# Patient Record
Sex: FEMALE
[De-identification: names, ages, dates, MRNs, and addresses within clinical notes are randomized per-mention and may not be internally consistent; named-entity substitution may affect disease eponyms.]

---

## 2017-10-04 ENCOUNTER — APPOINTMENT (OUTPATIENT)
Dept: ANTEPARTUM | Facility: CLINIC | Age: 32
End: 2017-10-04
Payer: MEDICAID

## 2017-10-04 ENCOUNTER — ASOB RESULT (OUTPATIENT)
Age: 32
End: 2017-10-04

## 2017-10-04 PROBLEM — Z00.00 ENCOUNTER FOR PREVENTIVE HEALTH EXAMINATION: Status: ACTIVE | Noted: 2017-10-04

## 2017-10-04 PROCEDURE — 76816 OB US FOLLOW-UP PER FETUS: CPT

## 2017-10-04 PROCEDURE — 76801 OB US < 14 WKS SINGLE FETUS: CPT

## 2017-10-26 ENCOUNTER — ASOB RESULT (OUTPATIENT)
Age: 32
End: 2017-10-26

## 2017-10-26 ENCOUNTER — APPOINTMENT (OUTPATIENT)
Dept: ANTEPARTUM | Facility: CLINIC | Age: 32
End: 2017-10-26
Payer: MEDICAID

## 2017-10-26 PROCEDURE — 76817 TRANSVAGINAL US OBSTETRIC: CPT

## 2017-11-29 ENCOUNTER — ASOB RESULT (OUTPATIENT)
Age: 32
End: 2017-11-29

## 2017-11-29 ENCOUNTER — APPOINTMENT (OUTPATIENT)
Dept: ANTEPARTUM | Facility: CLINIC | Age: 32
End: 2017-11-29
Payer: MEDICAID

## 2017-11-29 PROCEDURE — 76811 OB US DETAILED SNGL FETUS: CPT

## 2017-11-29 PROCEDURE — 76817 TRANSVAGINAL US OBSTETRIC: CPT

## 2018-01-26 ENCOUNTER — APPOINTMENT (OUTPATIENT)
Dept: ANTEPARTUM | Facility: CLINIC | Age: 33
End: 2018-01-26
Payer: MEDICAID

## 2018-01-26 ENCOUNTER — ASOB RESULT (OUTPATIENT)
Age: 33
End: 2018-01-26

## 2018-01-26 PROCEDURE — 76819 FETAL BIOPHYS PROFIL W/O NST: CPT

## 2018-01-26 PROCEDURE — 76816 OB US FOLLOW-UP PER FETUS: CPT

## 2018-03-09 ENCOUNTER — APPOINTMENT (OUTPATIENT)
Dept: ANTEPARTUM | Facility: CLINIC | Age: 33
End: 2018-03-09
Payer: MEDICAID

## 2018-03-09 ENCOUNTER — ASOB RESULT (OUTPATIENT)
Age: 33
End: 2018-03-09

## 2018-03-09 PROCEDURE — 76816 OB US FOLLOW-UP PER FETUS: CPT

## 2018-03-09 PROCEDURE — 76819 FETAL BIOPHYS PROFIL W/O NST: CPT

## 2018-03-27 ENCOUNTER — TRANSCRIPTION ENCOUNTER (OUTPATIENT)
Age: 33
End: 2018-03-27

## 2018-03-28 ENCOUNTER — RESULT REVIEW (OUTPATIENT)
Age: 33
End: 2018-03-28

## 2018-03-29 ENCOUNTER — TRANSCRIPTION ENCOUNTER (OUTPATIENT)
Age: 33
End: 2018-03-29

## 2020-02-09 ENCOUNTER — EMERGENCY (EMERGENCY)
Facility: HOSPITAL | Age: 35
LOS: 1 days | Discharge: HOME | End: 2020-02-09
Attending: EMERGENCY MEDICINE
Payer: COMMERCIAL

## 2020-02-09 VITALS
RESPIRATION RATE: 18 BRPM | OXYGEN SATURATION: 97 % | HEART RATE: 73 BPM | TEMPERATURE: 98 F | HEIGHT: 66 IN | WEIGHT: 199.96 LBS | DIASTOLIC BLOOD PRESSURE: 73 MMHG | SYSTOLIC BLOOD PRESSURE: 115 MMHG

## 2020-02-09 VITALS
OXYGEN SATURATION: 100 % | DIASTOLIC BLOOD PRESSURE: 90 MMHG | TEMPERATURE: 98 F | SYSTOLIC BLOOD PRESSURE: 133 MMHG | RESPIRATION RATE: 18 BRPM | HEART RATE: 68 BPM

## 2020-02-09 LAB
ALBUMIN SERPL ELPH-MCNC: 4 G/DL — SIGNIFICANT CHANGE UP (ref 3.3–5.2)
ALP SERPL-CCNC: 90 U/L — SIGNIFICANT CHANGE UP (ref 40–120)
ALT FLD-CCNC: 23 U/L — SIGNIFICANT CHANGE UP
ANION GAP SERPL CALC-SCNC: 13 MMOL/L — SIGNIFICANT CHANGE UP (ref 5–17)
AST SERPL-CCNC: 22 U/L — SIGNIFICANT CHANGE UP
BILIRUB SERPL-MCNC: 0.7 MG/DL — SIGNIFICANT CHANGE UP (ref 0.4–2)
BUN SERPL-MCNC: 14 MG/DL — SIGNIFICANT CHANGE UP (ref 8–20)
CALCIUM SERPL-MCNC: 9.1 MG/DL — SIGNIFICANT CHANGE UP (ref 8.6–10.2)
CHLORIDE SERPL-SCNC: 101 MMOL/L — SIGNIFICANT CHANGE UP (ref 98–107)
CO2 SERPL-SCNC: 22 MMOL/L — SIGNIFICANT CHANGE UP (ref 22–29)
CREAT SERPL-MCNC: 0.47 MG/DL — LOW (ref 0.5–1.3)
GLUCOSE SERPL-MCNC: 83 MG/DL — SIGNIFICANT CHANGE UP (ref 70–99)
HCG SERPL-ACNC: <4 MIU/ML — SIGNIFICANT CHANGE UP
HCT VFR BLD CALC: 38.1 % — SIGNIFICANT CHANGE UP (ref 34.5–45)
HGB BLD-MCNC: 12.5 G/DL — SIGNIFICANT CHANGE UP (ref 11.5–15.5)
LIDOCAIN IGE QN: 15 U/L — LOW (ref 22–51)
MCHC RBC-ENTMCNC: 29.3 PG — SIGNIFICANT CHANGE UP (ref 27–34)
MCHC RBC-ENTMCNC: 32.8 GM/DL — SIGNIFICANT CHANGE UP (ref 32–36)
MCV RBC AUTO: 89.2 FL — SIGNIFICANT CHANGE UP (ref 80–100)
PLATELET # BLD AUTO: 368 K/UL — SIGNIFICANT CHANGE UP (ref 150–400)
POTASSIUM SERPL-MCNC: 4.2 MMOL/L — SIGNIFICANT CHANGE UP (ref 3.5–5.3)
POTASSIUM SERPL-SCNC: 4.2 MMOL/L — SIGNIFICANT CHANGE UP (ref 3.5–5.3)
PROT SERPL-MCNC: 7.2 G/DL — SIGNIFICANT CHANGE UP (ref 6.6–8.7)
RBC # BLD: 4.27 M/UL — SIGNIFICANT CHANGE UP (ref 3.8–5.2)
RBC # FLD: 12.4 % — SIGNIFICANT CHANGE UP (ref 10.3–14.5)
SODIUM SERPL-SCNC: 136 MMOL/L — SIGNIFICANT CHANGE UP (ref 135–145)
WBC # BLD: 7.4 K/UL — SIGNIFICANT CHANGE UP (ref 3.8–10.5)
WBC # FLD AUTO: 7.4 K/UL — SIGNIFICANT CHANGE UP (ref 3.8–10.5)

## 2020-02-09 PROCEDURE — T1013: CPT

## 2020-02-09 PROCEDURE — 96374 THER/PROPH/DIAG INJ IV PUSH: CPT | Mod: XU

## 2020-02-09 PROCEDURE — 99284 EMERGENCY DEPT VISIT MOD MDM: CPT | Mod: 25

## 2020-02-09 PROCEDURE — 99282 EMERGENCY DEPT VISIT SF MDM: CPT

## 2020-02-09 PROCEDURE — 85027 COMPLETE CBC AUTOMATED: CPT

## 2020-02-09 PROCEDURE — 99285 EMERGENCY DEPT VISIT HI MDM: CPT

## 2020-02-09 PROCEDURE — 74177 CT ABD & PELVIS W/CONTRAST: CPT

## 2020-02-09 PROCEDURE — 83690 ASSAY OF LIPASE: CPT

## 2020-02-09 PROCEDURE — 80053 COMPREHEN METABOLIC PANEL: CPT

## 2020-02-09 PROCEDURE — 84702 CHORIONIC GONADOTROPIN TEST: CPT

## 2020-02-09 PROCEDURE — 36415 COLL VENOUS BLD VENIPUNCTURE: CPT

## 2020-02-09 PROCEDURE — 74177 CT ABD & PELVIS W/CONTRAST: CPT | Mod: 26

## 2020-02-09 RX ORDER — SODIUM CHLORIDE 9 MG/ML
1000 INJECTION INTRAMUSCULAR; INTRAVENOUS; SUBCUTANEOUS ONCE
Refills: 0 | Status: COMPLETED | OUTPATIENT
Start: 2020-02-09 | End: 2020-02-09

## 2020-02-09 RX ORDER — OXYCODONE AND ACETAMINOPHEN 5; 325 MG/1; MG/1
1 TABLET ORAL ONCE
Refills: 0 | Status: DISCONTINUED | OUTPATIENT
Start: 2020-02-09 | End: 2020-02-09

## 2020-02-09 RX ORDER — MORPHINE SULFATE 50 MG/1
4 CAPSULE, EXTENDED RELEASE ORAL ONCE
Refills: 0 | Status: DISCONTINUED | OUTPATIENT
Start: 2020-02-09 | End: 2020-02-09

## 2020-02-09 RX ADMIN — OXYCODONE AND ACETAMINOPHEN 1 TABLET(S): 5; 325 TABLET ORAL at 20:20

## 2020-02-09 RX ADMIN — SODIUM CHLORIDE 1000 MILLILITER(S): 9 INJECTION INTRAMUSCULAR; INTRAVENOUS; SUBCUTANEOUS at 14:28

## 2020-02-09 RX ADMIN — MORPHINE SULFATE 4 MILLIGRAM(S): 50 CAPSULE, EXTENDED RELEASE ORAL at 14:28

## 2020-02-09 NOTE — CONSULT NOTE ADULT - ASSESSMENT
ASSESSMENT: Patient is a 34y old female with reducible ventral hernia w/o signs of obstruction. Patient states pain improved. It was explained the need for surgery but patient decided that since she is feeling better she will follow with ACS as an outpatient. Risk's of not having surgery were explained and it was informed to return to the ED if her symptoms recur.     PLAN:    - PO challenge  - pain control  - f/u this Tuesday on ACS clinic.   - Plan discussed with Attending, Dr. Peralta.

## 2020-02-09 NOTE — CONSULT NOTE ADULT - ATTENDING COMMENTS
I have seen and examined patient  abd pain after emesis with food poisoning, passing flatus  on exam no toxic, abd obese, there is a 6 by 8 cm mass in supraumbilical area tender to palpation but partially reducible, no skin changes.  normal white count, CT With omental fat on hernia concerning for omental fat necrosis no bowel.  no obstructive symptoms.  I offered patient urgent repair, she does not want an operation at this time, she understand her condition but has to arrange care for her children.  She will be seen I the office on 2/11 and book for repair.  if pain worsen or if she develops obstructive symptoms she is instructed to return to the ED

## 2020-02-09 NOTE — ED STATDOCS - CARE PROVIDER_API CALL
Robert Kidd)  Surgery; Surgical Critical Care  [address] [place] [id number]  Phone: [phone]  Fax: [phone]  Follow Up Time:

## 2020-02-09 NOTE — ED STATDOCS - NSFOLLOWUPCLINICS_GEN_ALL_ED_FT
[facility] General Surgery  Surgery  [address] [place] [address]  Phone: [phone]  Fax:   Follow Up Time:

## 2020-02-09 NOTE — ED STATDOCS - ATTENDING CONTRIBUTION TO CARE
Valarie: I performed a face to face bedside interview with patient regarding history of present illness, review of symptoms and past medical history. I completed an independent physical exam and ordered tests/medications as needed.  I have discussed patient's plan of care with advanced care provider. The advanced care provider assisted in  executing the discussed plan. I was available for any questions or issues that may have arose during the execution of the plan of care.

## 2020-02-09 NOTE — ED STATDOCS - NSFOLLOWUPINSTRUCTIONS_ED_ALL_ED_FT
1. TAKE ALL MEDICATIONS AS DIRECTED.  FOR PAIN YOU CAN TAKE IBUPROFEN (MOTRIN, ADVIL) OR ACETAMINOPHEN (TYLENOL) AS NEEDED, AS DIRECTED ON PACKAGING.  2. FOLLOW UP WITH __Surgery________ AS DIRECTED.  YOU WERE GIVEN COPIES OF ALL LABS AND IMAGING RESULTS FROM YOUR ER VISIT--PLEASE TAKE THEM WITH YOU TO YOUR APPOINTMENT.  3. IF NEEDED, CALL [phone] TO FIND A PRIMARY CARE PHYSICIAN.  OR CALL [phone] TO MAKE AN APPOINTMENT WITH THE MEDICAL CLINIC.  4. RETURN TO THE ER FOR ANY WORSENING SYMPTOMS.    Hernia    A hernia is when fat or the intestines push through a weak area in the abdominal wall. This can occur around the belly button (umbilical hernia) or where the leg meets the lower abdomen (inguinal hernia). This creates a rounded lump (bulge). Hernias that can be pushed back into the belly are called reducible and those that cannot are called incarcerated. Hernias that are not reducible and lose their blood supply are called strangulated and require emergency surgery. Hernias can be caused or worsened when straining during bowel movements or lifting heavy objects as well as coughing or sneezing. Surgery may be helpful in treating this condition so follow up with a surgeon.    SEEK IMMEDIATE MEDICAL CARE IF YOU HAVE ANY OF THE FOLLOWING SYMPTOMS: worsening abdominal pain, change in color over the hernia, nausea/vomiting, or inability to have a bowel movement or pass gas.

## 2020-02-09 NOTE — ED STATDOCS - PHYSICAL EXAMINATION
Gen: Well appearing in NAD  Head: NC/AT  Neck: trachea midline  Resp:  No distress  Abd: Tenderness over ventral hernia. No overlying erythema. Positive Guarding, no rebound.   Ext: no deformities  Neuro:  A&O appears non focal  Skin:  Warm and dry as visualized  Psych:  Normal affect and mood Gen: Well appearing in NAD  Head: NC/AT  Neck: trachea midline  Resp:  No distress  Abd: Tenderness over firm ventral hernia. No overlying erythema. Positive Guarding, no rebound.   Ext: no deformities  Neuro:  A&O appears non focal  Skin:  Warm and dry as visualized  Psych:  Normal affect and mood

## 2020-02-09 NOTE — ED STATDOCS - PATIENT PORTAL LINK FT
You can access the FollowMyHealth Patient Portal offered by [facility] by registering at the following website: [email or web address]. By joining [employer]’s FollowMyHealth portal, you will also be able to view your health information using other applications (apps) compatible with our system.

## 2020-02-09 NOTE — ED STATDOCS - PROGRESS NOTE DETAILS
Results noted and findings d/w pt. Pt reexamined - abdomen obese with + large ventral hernia - tender to palp and unable to reduce. ACS called for consult Pt seen and cleared by surgery, will follow pt in office

## 2020-02-09 NOTE — ED ADULT TRIAGE NOTE - CHIEF COMPLAINT QUOTE
Patient arrived to ED today with c/o constipation, diarrhea, cough, abdominal pains.  Patient with abdominal hernia.  Patient states symptoms for the past week.

## 2020-02-09 NOTE — ED STATDOCS - OBJECTIVE STATEMENT
35 y/o F pt with no significant PMHx presents to the ED c/o abdominal pain that started 6 days ago, as well as, N/V/D. Yesterday she had a small bowel movement and she also began to notice that her abdomen appeared "swollen." Over the past week she has had 3 episodes of emesis. Pt states that at home her two children have also been experiencing similar symptoms. Denies fever. SHx:  x2. No further complaints at this time.  : Doris 35 y/o F pt with no significant PMHx presents to the ED c/o abdominal pain that started 6 days ago, as well as, N/V/D. Yesterday she had a small bowel movement and she also began to notice that her abdomen appeared "swollen." Over the past week she has had 3 episodes of emesis. Has a known hernia, feels like it is blocked. Pt states that at home her two children have also been experiencing similar symptoms. Denies fever. SHx:  x2. No further complaints at this time.  : Doris

## 2020-02-09 NOTE — CONSULT NOTE ADULT - SUBJECTIVE AND OBJECTIVE BOX
ACUTE CARE SURGERY CONSULT      HPI: 34y Female with no PMH who presented to the ED with severe, increasing abdominal pain localized in her ventral hernia. Patient states that last week all her family had food poisoning and she started vomiting, the next day she stopped vomiting but developed mild abdominal pain that kept getting worse until she presented today. Nausea and vomiting resolved. patient states that she's always had an hernia but now she cannot reduce it. Passing flatus, last time was today. Last BM was yesterday. Tolerating diet. Denies fever, chills, chest pain, and sob.     ROS: 10-system review is otherwise negative except HPI above.      PAST MEDICAL & SURGICAL HISTORY:  No pertinent past medical history  No significant past surgical history    FAMILY HISTORY:  No relevant family history     SOCIAL HISTORY:  Denies any toxic habits    ALLERGIES: NKA Zyrtec (Unknown)      HOME MEDICATIONS: Denies       --------------------------------------------------------------------------------------------    PHYSICAL EXAM:   General: NAD, Lying in bed comfortably  Neuro: A+Ox3  HEENT: EOMI, PERRLA, MMM  Cardio: RRR   Resp: Non labored breathing   GI/Abd: Soft, ND. Tender to palpation in ventral hernia which is reducible. No skin changes.   Vascular: All 4 extremities warm.  Musculoskeletal: All 4 extremities moving spontaneously, no limitations, no spinal tenderness or stepoffs  --------------------------------------------------------------------------------------------    LABS                 12.5   7.40   )----------(  368       ( 09 Feb 2020 14:36 )               38.1      136    |  101    |  14.0   ----------------------------<  83         ( 09 Feb 2020 14:36 )  4.2     |  22.0   |  0.47     Ca    9.1        ( 09 Feb 2020 14:36 )    TPro  7.2    /  Alb  4.0    /  TBili  0.7    /  DBili  x      /  AST  22     /  ALT  23     /  AlkPhos  90     ( 09 Feb 2020 14:36 )    LIVER FUNCTIONS - ( 09 Feb 2020 14:36 )  Alb: 4.0 g/dL / Pro: 7.2 g/dL / ALK PHOS: 90 U/L / ALT: 23 U/L / AST: 22 U/L / GGT: x               CAPILLARY BLOOD GLUCOSE    --------------------------------------------------------------------------------------------  IMAGING  EXAM: CT ABDOMEN AND PELVIS OC      PROCEDURE DATE: 02/09/2020         INTERPRETATION: CLINICAL INFORMATION: Tender ventral hernia.     COMPARISON: None.     PROCEDURE:   CT of the Abdomen and Pelvis was performed with intravenous contrast.   Intravenous contrast: 90 ml Omnipaque 350. 10 ml discarded.   Oral contrast: positive contrast was administered.   Sagittal and coronal reformats were performed.     FINDINGS:     LOWER CHEST: Within normal limits.     LIVER: Diffuse hepatic steatosis.   BILE DUCTS: Normal caliber.   GALLBLADDER: Within normal limits.   SPLEEN: Small splenule lateral to the spleen adjacent to the diaphragm.   PANCREAS: Within normal limits.   ADRENALS: Within normal limits.   KIDNEYS/URETERS: Within normal limits.     BLADDER: Within normal limits.   REPRODUCTIVE ORGANS: Calcified anterior fibroid impressing on the dome of   the bladder.     BOWEL: No bowel obstruction. Appendix is normal.   PERITONEUM: No ascites.   VESSELS: Within normal limits.   RETROPERITONEUM/LYMPH NODES: No lymphadenopathy.   ABDOMINAL WALL: There is a large ventral supraumbilical fat-containing   abdominal wall hernia with fascial defect measuring 3.3 x 2.9 cm containing   omental fat. There is significant fat stranding and fluid within the hernia   sac consistent with fat necrosis. The fat stranding extends back into the   peritoneal cavity along the omentum. There is a second ventral periumbilical   fat-containing hernia which also demonstrates omental fat stranding.   BONES: Within normal limits.     IMPRESSION:   1. Large ventral supraumbilical fat-containing hernia containing both   abdominal and omental fat. There is significant fat stranding of the omental   fat with fluid consistent with fat necrosis.   2. There is a second ventral abdominal wall periumbilical hernia also   containing omental fat.   3. Diffuse hepatic steatosis.

## 2020-02-09 NOTE — ED STATDOCS - CLINICAL SUMMARY MEDICAL DECISION MAKING FREE TEXT BOX
Pt with known hernia presents with abdominal pain and decreased bowel movement. Tenderness to the ventral hernia on exam. Plan for labs, CT, pain control and re-asses.

## 2020-02-11 ENCOUNTER — APPOINTMENT (OUTPATIENT)
Dept: TRAUMA SURGERY | Facility: CLINIC | Age: 35
End: 2020-02-11
Payer: COMMERCIAL

## 2020-02-11 VITALS
BODY MASS INDEX: 39.47 KG/M2 | SYSTOLIC BLOOD PRESSURE: 119 MMHG | DIASTOLIC BLOOD PRESSURE: 79 MMHG | WEIGHT: 220 LBS | HEART RATE: 74 BPM | OXYGEN SATURATION: 97 % | TEMPERATURE: 97.7 F | HEIGHT: 62.5 IN

## 2020-02-11 PROCEDURE — 99211 OFF/OP EST MAY X REQ PHY/QHP: CPT

## 2020-02-16 ENCOUNTER — TRANSCRIPTION ENCOUNTER (OUTPATIENT)
Age: 35
End: 2020-02-16

## 2020-02-17 ENCOUNTER — RESULT REVIEW (OUTPATIENT)
Age: 35
End: 2020-02-17

## 2020-02-17 ENCOUNTER — INPATIENT (INPATIENT)
Facility: HOSPITAL | Age: 35
LOS: 0 days | Discharge: HOME | DRG: 355 | End: 2020-02-18
Attending: STUDENT IN AN ORGANIZED HEALTH CARE EDUCATION/TRAINING PROGRAM | Admitting: STUDENT IN AN ORGANIZED HEALTH CARE EDUCATION/TRAINING PROGRAM
Payer: COMMERCIAL

## 2020-02-17 VITALS
TEMPERATURE: 98 F | SYSTOLIC BLOOD PRESSURE: 117 MMHG | DIASTOLIC BLOOD PRESSURE: 69 MMHG | RESPIRATION RATE: 18 BRPM | WEIGHT: 210.1 LBS | HEART RATE: 73 BPM | OXYGEN SATURATION: 100 % | HEIGHT: 66 IN

## 2020-02-17 DIAGNOSIS — K43.9 VENTRAL HERNIA WITHOUT OBSTRUCTION OR GANGRENE: ICD-10-CM

## 2020-02-17 PROBLEM — Z78.9 OTHER SPECIFIED HEALTH STATUS: Chronic | Status: ACTIVE | Noted: 2020-02-09

## 2020-02-17 LAB
ALBUMIN SERPL ELPH-MCNC: 4.2 G/DL — SIGNIFICANT CHANGE UP (ref 3.3–5.2)
ALP SERPL-CCNC: 88 U/L — SIGNIFICANT CHANGE UP (ref 40–120)
ALT FLD-CCNC: 22 U/L — SIGNIFICANT CHANGE UP
ANION GAP SERPL CALC-SCNC: 8 MMOL/L — SIGNIFICANT CHANGE UP (ref 5–17)
AST SERPL-CCNC: 21 U/L — SIGNIFICANT CHANGE UP
BASOPHILS # BLD AUTO: 0.04 K/UL — SIGNIFICANT CHANGE UP (ref 0–0.2)
BASOPHILS NFR BLD AUTO: 0.6 % — SIGNIFICANT CHANGE UP (ref 0–2)
BILIRUB SERPL-MCNC: 0.3 MG/DL — LOW (ref 0.4–2)
BLD GP AB SCN SERPL QL: SIGNIFICANT CHANGE UP
BUN SERPL-MCNC: 16 MG/DL — SIGNIFICANT CHANGE UP (ref 8–20)
CALCIUM SERPL-MCNC: 8.8 MG/DL — SIGNIFICANT CHANGE UP (ref 8.6–10.2)
CHLORIDE SERPL-SCNC: 104 MMOL/L — SIGNIFICANT CHANGE UP (ref 98–107)
CO2 SERPL-SCNC: 27 MMOL/L — SIGNIFICANT CHANGE UP (ref 22–29)
CREAT SERPL-MCNC: 0.56 MG/DL — SIGNIFICANT CHANGE UP (ref 0.5–1.3)
EOSINOPHIL # BLD AUTO: 0.38 K/UL — SIGNIFICANT CHANGE UP (ref 0–0.5)
EOSINOPHIL NFR BLD AUTO: 5.5 % — SIGNIFICANT CHANGE UP (ref 0–6)
GLUCOSE SERPL-MCNC: 87 MG/DL — SIGNIFICANT CHANGE UP (ref 70–99)
HCT VFR BLD CALC: 35.1 % — SIGNIFICANT CHANGE UP (ref 34.5–45)
HGB BLD-MCNC: 11.5 G/DL — SIGNIFICANT CHANGE UP (ref 11.5–15.5)
IMM GRANULOCYTES NFR BLD AUTO: 0.1 % — SIGNIFICANT CHANGE UP (ref 0–1.5)
LACTATE BLDV-MCNC: 0.4 MMOL/L — LOW (ref 0.5–2)
LYMPHOCYTES # BLD AUTO: 3.41 K/UL — HIGH (ref 1–3.3)
LYMPHOCYTES # BLD AUTO: 49.5 % — HIGH (ref 13–44)
MCHC RBC-ENTMCNC: 29.6 PG — SIGNIFICANT CHANGE UP (ref 27–34)
MCHC RBC-ENTMCNC: 32.8 GM/DL — SIGNIFICANT CHANGE UP (ref 32–36)
MCV RBC AUTO: 90.5 FL — SIGNIFICANT CHANGE UP (ref 80–100)
MONOCYTES # BLD AUTO: 0.53 K/UL — SIGNIFICANT CHANGE UP (ref 0–0.9)
MONOCYTES NFR BLD AUTO: 7.7 % — SIGNIFICANT CHANGE UP (ref 2–14)
NEUTROPHILS # BLD AUTO: 2.52 K/UL — SIGNIFICANT CHANGE UP (ref 1.8–7.4)
NEUTROPHILS NFR BLD AUTO: 36.6 % — LOW (ref 43–77)
PLATELET # BLD AUTO: 387 K/UL — SIGNIFICANT CHANGE UP (ref 150–400)
POTASSIUM SERPL-MCNC: 3.8 MMOL/L — SIGNIFICANT CHANGE UP (ref 3.5–5.3)
POTASSIUM SERPL-SCNC: 3.8 MMOL/L — SIGNIFICANT CHANGE UP (ref 3.5–5.3)
PROT SERPL-MCNC: 6.8 G/DL — SIGNIFICANT CHANGE UP (ref 6.6–8.7)
RBC # BLD: 3.88 M/UL — SIGNIFICANT CHANGE UP (ref 3.8–5.2)
RBC # FLD: 12.3 % — SIGNIFICANT CHANGE UP (ref 10.3–14.5)
SODIUM SERPL-SCNC: 139 MMOL/L — SIGNIFICANT CHANGE UP (ref 135–145)
WBC # BLD: 6.89 K/UL — SIGNIFICANT CHANGE UP (ref 3.8–10.5)
WBC # FLD AUTO: 6.89 K/UL — SIGNIFICANT CHANGE UP (ref 3.8–10.5)

## 2020-02-17 PROCEDURE — 74177 CT ABD & PELVIS W/CONTRAST: CPT | Mod: 26

## 2020-02-17 PROCEDURE — 49568: CPT

## 2020-02-17 PROCEDURE — 49561: CPT

## 2020-02-17 PROCEDURE — 99222 1ST HOSP IP/OBS MODERATE 55: CPT

## 2020-02-17 PROCEDURE — 99285 EMERGENCY DEPT VISIT HI MDM: CPT

## 2020-02-17 PROCEDURE — 49568: CPT | Mod: 80

## 2020-02-17 PROCEDURE — 49561: CPT | Mod: 80

## 2020-02-17 PROCEDURE — 88302 TISSUE EXAM BY PATHOLOGIST: CPT | Mod: 26

## 2020-02-17 RX ORDER — ACETAMINOPHEN 500 MG
650 TABLET ORAL EVERY 6 HOURS
Refills: 0 | Status: DISCONTINUED | OUTPATIENT
Start: 2020-02-17 | End: 2020-02-18

## 2020-02-17 RX ORDER — ONDANSETRON 8 MG/1
4 TABLET, FILM COATED ORAL ONCE
Refills: 0 | Status: DISCONTINUED | OUTPATIENT
Start: 2020-02-17 | End: 2020-02-17

## 2020-02-17 RX ORDER — SENNA PLUS 8.6 MG/1
2 TABLET ORAL AT BEDTIME
Refills: 0 | Status: DISCONTINUED | OUTPATIENT
Start: 2020-02-17 | End: 2020-02-18

## 2020-02-17 RX ORDER — HYDROMORPHONE HYDROCHLORIDE 2 MG/ML
0.5 INJECTION INTRAMUSCULAR; INTRAVENOUS; SUBCUTANEOUS
Refills: 0 | Status: DISCONTINUED | OUTPATIENT
Start: 2020-02-17 | End: 2020-02-17

## 2020-02-17 RX ORDER — SODIUM CHLORIDE 9 MG/ML
1000 INJECTION INTRAMUSCULAR; INTRAVENOUS; SUBCUTANEOUS
Refills: 0 | Status: DISCONTINUED | OUTPATIENT
Start: 2020-02-17 | End: 2020-02-18

## 2020-02-17 RX ORDER — OXYCODONE HYDROCHLORIDE 5 MG/1
5 TABLET ORAL EVERY 4 HOURS
Refills: 0 | Status: DISCONTINUED | OUTPATIENT
Start: 2020-02-17 | End: 2020-02-18

## 2020-02-17 RX ORDER — SODIUM CHLORIDE 9 MG/ML
1000 INJECTION, SOLUTION INTRAVENOUS
Refills: 0 | Status: DISCONTINUED | OUTPATIENT
Start: 2020-02-17 | End: 2020-02-17

## 2020-02-17 RX ORDER — ONDANSETRON 8 MG/1
4 TABLET, FILM COATED ORAL EVERY 6 HOURS
Refills: 0 | Status: DISCONTINUED | OUTPATIENT
Start: 2020-02-17 | End: 2020-02-18

## 2020-02-17 RX ORDER — PANTOPRAZOLE SODIUM 20 MG/1
40 TABLET, DELAYED RELEASE ORAL
Refills: 0 | Status: DISCONTINUED | OUTPATIENT
Start: 2020-02-17 | End: 2020-02-18

## 2020-02-17 RX ORDER — MORPHINE SULFATE 50 MG/1
2 CAPSULE, EXTENDED RELEASE ORAL ONCE
Refills: 0 | Status: DISCONTINUED | OUTPATIENT
Start: 2020-02-17 | End: 2020-02-17

## 2020-02-17 RX ORDER — ENOXAPARIN SODIUM 100 MG/ML
40 INJECTION SUBCUTANEOUS EVERY 24 HOURS
Refills: 0 | Status: DISCONTINUED | OUTPATIENT
Start: 2020-02-17 | End: 2020-02-18

## 2020-02-17 RX ORDER — HYDROMORPHONE HYDROCHLORIDE 2 MG/ML
0.5 INJECTION INTRAMUSCULAR; INTRAVENOUS; SUBCUTANEOUS EVERY 4 HOURS
Refills: 0 | Status: DISCONTINUED | OUTPATIENT
Start: 2020-02-17 | End: 2020-02-18

## 2020-02-17 RX ORDER — FENTANYL CITRATE 50 UG/ML
25 INJECTION INTRAVENOUS
Refills: 0 | Status: DISCONTINUED | OUTPATIENT
Start: 2020-02-17 | End: 2020-02-17

## 2020-02-17 RX ADMIN — HYDROMORPHONE HYDROCHLORIDE 0.5 MILLIGRAM(S): 2 INJECTION INTRAMUSCULAR; INTRAVENOUS; SUBCUTANEOUS at 17:27

## 2020-02-17 RX ADMIN — HYDROMORPHONE HYDROCHLORIDE 0.5 MILLIGRAM(S): 2 INJECTION INTRAMUSCULAR; INTRAVENOUS; SUBCUTANEOUS at 15:03

## 2020-02-17 RX ADMIN — SENNA PLUS 2 TABLET(S): 8.6 TABLET ORAL at 22:44

## 2020-02-17 RX ADMIN — HYDROMORPHONE HYDROCHLORIDE 0.5 MILLIGRAM(S): 2 INJECTION INTRAMUSCULAR; INTRAVENOUS; SUBCUTANEOUS at 17:42

## 2020-02-17 RX ADMIN — HYDROMORPHONE HYDROCHLORIDE 0.5 MILLIGRAM(S): 2 INJECTION INTRAMUSCULAR; INTRAVENOUS; SUBCUTANEOUS at 15:28

## 2020-02-17 RX ADMIN — OXYCODONE HYDROCHLORIDE 5 MILLIGRAM(S): 5 TABLET ORAL at 21:25

## 2020-02-17 RX ADMIN — Medication 650 MILLIGRAM(S): at 19:52

## 2020-02-17 RX ADMIN — SODIUM CHLORIDE 120 MILLILITER(S): 9 INJECTION INTRAMUSCULAR; INTRAVENOUS; SUBCUTANEOUS at 20:27

## 2020-02-17 RX ADMIN — OXYCODONE HYDROCHLORIDE 5 MILLIGRAM(S): 5 TABLET ORAL at 20:25

## 2020-02-17 RX ADMIN — Medication 650 MILLIGRAM(S): at 20:52

## 2020-02-17 RX ADMIN — HYDROMORPHONE HYDROCHLORIDE 0.5 MILLIGRAM(S): 2 INJECTION INTRAMUSCULAR; INTRAVENOUS; SUBCUTANEOUS at 22:58

## 2020-02-17 RX ADMIN — HYDROMORPHONE HYDROCHLORIDE 0.5 MILLIGRAM(S): 2 INJECTION INTRAMUSCULAR; INTRAVENOUS; SUBCUTANEOUS at 22:41

## 2020-02-17 RX ADMIN — ENOXAPARIN SODIUM 40 MILLIGRAM(S): 100 INJECTION SUBCUTANEOUS at 17:27

## 2020-02-17 RX ADMIN — MORPHINE SULFATE 2 MILLIGRAM(S): 50 CAPSULE, EXTENDED RELEASE ORAL at 03:59

## 2020-02-17 NOTE — CONSULT NOTE ADULT - ASSESSMENT
34 year old female with known ventral hernias currently with no evidence of strangulation, however, pending CT A/P  to assess for any interval changes since last CT scan given worsening abdominal pain. ACS/Trauma team following and attending to see patient in the AM.

## 2020-02-17 NOTE — H&P ADULT - HISTORY OF PRESENT ILLNESS
34 year old female presenting with chief complaint of abdominal pain x1 day. Patient known to ACS/Trauma team as she had presented with abdominal pain 2/9/20 and found to have a large ventral supraumbilical and periumbilical hernia requiring operative repair. Patient followed up with ACS/Trauma office and was scheduled for surgery 2/19/20 and told to return to ED should abdominal pain worsen prior to surgery. Describes pain as sharp, constant, non radiating pain located in supraumbilical region. Reports that she is tolerating diet, having flatus and had a BM day prior to presentation. ROS negative for fever, chills, nausea, vomiting, chest pain, SOB, dyspnea, dysuria or changes in bowel habits.

## 2020-02-17 NOTE — H&P ADULT - NSHPLABSRESULTS_GEN_ALL_CORE
< from: CT Abdomen and Pelvis w/ IV Cont (02.17.20 @ 06:59) >    IMPRESSION:   Stable exam. Supraumbilical ventral abdominal wall fat-containing hernia without evidence of fat necrosis. Small fat-containing umbilical hernia and inflammatory changes in the adjacent ventral abdominal wall fat.    No bowel obstruction or wall thickening.    < end of copied text >

## 2020-02-17 NOTE — ED ADULT NURSE NOTE - CHPI ED NUR SYMPTOMS NEG
no tingling/no weakness/no fever/no nausea/no chills/no dizziness/no pain/no decreased eating/drinking/no vomiting

## 2020-02-17 NOTE — CONSULT NOTE ADULT - ATTENDING COMMENTS
Patient seen and examined  epigastric symptomatic ventral hernia with incarcerated omentum.  wa scheduled for latter this week but pain worsen.  prepare for OR

## 2020-02-17 NOTE — CONSULT NOTE ADULT - SUBJECTIVE AND OBJECTIVE BOX
34 year old female presenting with chief complaint of abdominal pain x1 day. Patient known to ACS/Trauma team as she had presented with abdominal pain 2/9/20 and found to have a large ventral supraumbilical and periumbilical hernia requiring operative repair. Patient followed up with ACS/Trauma office and was scheduled for surgery 2/19/20 and told to return to ED should abdominal pain worsen prior to surgery. Describes pain as sharp, constant, non radiating pain located in supraumbilical region. Reports that she is tolerating diet, having flatus and had a BM day prior to presentation. ROS negative for fever, chills, nausea, vomiting, chest pain, SOB, dyspnea, dysuria or changes in bowel habits.   PMHx: Denies   PSHx: C section x 2   Allergies: Zyrtec (rash)   Home Meds: Denies   FamHx: Non contributory   SocHx: Denies toxic habits     VITALS & I/Os:  Vital Signs Last 24 Hrs  T(C): 36.7 (17 Feb 2020 01:46), Max: 36.7 (17 Feb 2020 01:46)  T(F): 98.1 (17 Feb 2020 01:46), Max: 98.1 (17 Feb 2020 01:46)  HR: 73 (17 Feb 2020 01:46) (73 - 73)  BP: 117/69 (17 Feb 2020 01:46) (117/69 - 117/69)  BP(mean): --  RR: 18 (17 Feb 2020 01:46) (18 - 18)  SpO2: 100% (17 Feb 2020 01:46) (100% - 100%)  CAPILLARY BLOOD GLUCOSE    I&O's Summary    Constitutional: Obese Kazakh speaking only patient resting comfortably in bed in no acute distress   HEENT: EOMI/PERRL b/l  Neck: No JVD, full ROM w/o pain  Respiratory: CTAB with unlabored respirations, no accessory muscle use or conversational dyspnea   Cardiovascular: Regular rate and rhythm with no arrythmias or murmurs  Gastrointestinal: Abdomen soft, periumbilical hernia moderately tender to palpation with no overlying skin changes, non-distended with no rebound tenderness or guarding   Rectal: Not indicated   Neurological: GCS 15 (E4V5M6) with no gross sensory, motor or coordination deficits   Psychiatric: Normal mood and affect   Musculoskeletal: No joint pain, swelling or deformity with no limitation of movement     LABS:                        11.5   6.89  )-----------( 387      ( 17 Feb 2020 04:16 )             35.1     02-17    139  |  104  |  16.0  ----------------------------<  87  3.8   |  27.0  |  0.56    Ca    8.8      17 Feb 2020 04:16    TPro  6.8  /  Alb  4.2  /  TBili  0.3<L>  /  DBili  x   /  AST  21  /  ALT  22  /  AlkPhos  88  02-17    Lactate:      02-17 @ 04:15  0.4

## 2020-02-17 NOTE — ED ADULT NURSE NOTE - OBJECTIVE STATEMENT
pt is a 33 y/o female presenting ot the ed with epigastric abdominal pain. pt recently seen at [facility] on feb 8th for abdominal pain - patient with hx of ventral hernia was seen by surgery offered surgery and declined, told to follow up outpatiently and never did, returning for continued pain. pt with bowel movements. pt denies cp, SOB, fevers, nausea, vomiting

## 2020-02-17 NOTE — H&P ADULT - ASSESSMENT
34 year old female with known ventral hernias currently with no evidence of strangulation.    -Admit to Surgery  -OR today for hernia repair   -NPO/IVFs  -Pain management   Encourage I/S  -DVT prophylaxis

## 2020-02-17 NOTE — CHART NOTE - NSCHARTNOTEFT_GEN_A_CORE
POST-OPERATIVE NOTE    Subjective:  Patient is s/p open ventral hernia repair with retrorectus placement of prolene mesh. Patient doing well post operatively. tolerating diet. pain moderately controlled. Patient pulling 1000 on IS. patient voiding spontaneously. Patient has not ambulated 2/2 pain. denies f/c/sob/n/v. AVSS hemodynamically stable.      Vital Signs Last 24 Hrs  T(C): 37.1 (17 Feb 2020 20:15), Max: 37.2 (17 Feb 2020 14:33)  T(F): 98.7 (17 Feb 2020 20:15), Max: 99 (17 Feb 2020 14:33)  HR: 97 (17 Feb 2020 20:15) (67 - 97)  BP: 116/67 (17 Feb 2020 20:15) (95/54 - 117/69)  BP(mean): 62 (17 Feb 2020 16:30) (62 - 74)  RR: 18 (17 Feb 2020 20:15) (16 - 22)  SpO2: 95% (17 Feb 2020 20:15) (93% - 100%)  I&O's Detail    17 Feb 2020 07:01  -  17 Feb 2020 23:04  --------------------------------------------------------  IN:  Total IN: 0 mL    OUT:    Voided: 1450 mL  Total OUT: 1450 mL    Total NET: -1450 mL        enoxaparin Injectable 40    PAST MEDICAL & SURGICAL HISTORY:  No pertinent past medical history  No significant past surgical history        Physical Exam:  General: NAD, resting comfortably in bed  Pulmonary: Nonlabored breathing, no respiratory distress  Cardiovascular: NSR  Abdominal: soft, appropriately tender. abdominal binder in place. dressings with mild serosanguinous saturation without active bleeding  Extremities: [facility]      LABS:                        11.5   6.89  )-----------( 387      ( 17 Feb 2020 04:16 )             35.1     02-17    139  |  104  |  16.0  ----------------------------<  87  3.8   |  27.0  |  0.56    Ca    8.8      17 Feb 2020 04:16    TPro  6.8  /  Alb  4.2  /  TBili  0.3<L>  /  DBili  x   /  AST  21  /  ALT  22  /  AlkPhos  88  02-17      CAPILLARY BLOOD GLUCOSE          Radiology and Additional Studies:    Assessment:  The patient is a 34y Female who is now several hours post-op from a VHR with retrorectus mesh    Plan:  - Pain control as needed  - DVT ppx  - OOB and ambulating as tolerated  - regular diet  - likely D/c 2/18 am

## 2020-02-17 NOTE — BRIEF OPERATIVE NOTE - NSICDXBRIEFPROCEDURE_GEN_ALL_CORE_FT
PROCEDURES:  Ventral hernia repair with mesh 17-Feb-2020 14:38:46 retrorectus placement of prolene mesh Jarek Longoria

## 2020-02-17 NOTE — ED PROVIDER NOTE - CLINICAL SUMMARY MEDICAL DECISION MAKING FREE TEXT BOX
35 y/o F with h/o ventral hernia who was seen last week for similar abdominal pain, was offered surgical repair, but refused at that time presents complaining of abdominal pain. Clinically not obstructed. She was evaluated by surgery who recommended repeat CT scan and will take patient to the OR for ventral hernia repair.

## 2020-02-17 NOTE — BRIEF OPERATIVE NOTE - OPERATION/FINDINGS
- large, chronic, incarcerated ventral hernia with pre-peritoneal fat contents  - hernia sac entered and contents amputated   - large prolene mesh placed in retrorectus space

## 2020-02-17 NOTE — H&P ADULT - NSHPPHYSICALEXAM_GEN_ALL_CORE
Constitutional: Obese Latvian speaking only patient resting comfortably in bed in no acute distress   HEENT: EOMI/PERRL b/l  Neck: No JVD, full ROM w/o pain  Respiratory: CTAB with unlabored respirations, no accessory muscle use or conversational dyspnea   Cardiovascular: Regular rate and rhythm with no arrythmias or murmurs  Gastrointestinal: Abdomen soft, periumbilical hernia moderately tender to palpation with no overlying skin changes, non-distended with no rebound tenderness or guarding   Rectal: Not indicated   Neurological: GCS 15 (E4V5M6) with no gross sensory, motor or coordination deficits   Psychiatric: Normal mood and affect   Musculoskeletal: No joint pain, swelling or deformity with no limitation of movement

## 2020-02-17 NOTE — ED PROVIDER NOTE - OBJECTIVE STATEMENT
pt is a 33 y/o female presenting ot the ed with epigastric abdominal pain. pt recently seen at [facility] on feb 8th for abdominal pain - patient with hx of ventral hernia was seen by surgery offered surgery and declined, told to follow up outpatiently and never did, returning for continued pain. pt with bowel movements. pt denies cp, SOB, fevers, nausea, vomiting

## 2020-02-17 NOTE — ED ADULT TRIAGE NOTE - CHIEF COMPLAINT QUOTE
patient states that she has abdominal pain for 2 days denies N/V, patient with HX of hernia having surgery weds was told to got to hospital if pain get worse

## 2020-02-18 ENCOUNTER — TRANSCRIPTION ENCOUNTER (OUTPATIENT)
Age: 35
End: 2020-02-18

## 2020-02-18 VITALS
SYSTOLIC BLOOD PRESSURE: 109 MMHG | OXYGEN SATURATION: 100 % | HEART RATE: 78 BPM | TEMPERATURE: 98 F | DIASTOLIC BLOOD PRESSURE: 69 MMHG | RESPIRATION RATE: 18 BRPM

## 2020-02-18 LAB
ANION GAP SERPL CALC-SCNC: 13 MMOL/L — SIGNIFICANT CHANGE UP (ref 5–17)
BASOPHILS # BLD AUTO: 0.02 K/UL — SIGNIFICANT CHANGE UP (ref 0–0.2)
BASOPHILS NFR BLD AUTO: 0.2 % — SIGNIFICANT CHANGE UP (ref 0–2)
BUN SERPL-MCNC: 10 MG/DL — SIGNIFICANT CHANGE UP (ref 8–20)
CALCIUM SERPL-MCNC: 8.9 MG/DL — SIGNIFICANT CHANGE UP (ref 8.6–10.2)
CHLORIDE SERPL-SCNC: 102 MMOL/L — SIGNIFICANT CHANGE UP (ref 98–107)
CO2 SERPL-SCNC: 24 MMOL/L — SIGNIFICANT CHANGE UP (ref 22–29)
CREAT SERPL-MCNC: 0.58 MG/DL — SIGNIFICANT CHANGE UP (ref 0.5–1.3)
EOSINOPHIL # BLD AUTO: 0.01 K/UL — SIGNIFICANT CHANGE UP (ref 0–0.5)
EOSINOPHIL NFR BLD AUTO: 0.1 % — SIGNIFICANT CHANGE UP (ref 0–6)
GLUCOSE SERPL-MCNC: 110 MG/DL — HIGH (ref 70–99)
HCT VFR BLD CALC: 34.8 % — SIGNIFICANT CHANGE UP (ref 34.5–45)
HGB BLD-MCNC: 11 G/DL — LOW (ref 11.5–15.5)
IMM GRANULOCYTES NFR BLD AUTO: 0.4 % — SIGNIFICANT CHANGE UP (ref 0–1.5)
LYMPHOCYTES # BLD AUTO: 18.6 % — SIGNIFICANT CHANGE UP (ref 13–44)
LYMPHOCYTES # BLD AUTO: 2.26 K/UL — SIGNIFICANT CHANGE UP (ref 1–3.3)
MAGNESIUM SERPL-MCNC: 2.1 MG/DL — SIGNIFICANT CHANGE UP (ref 1.6–2.6)
MCHC RBC-ENTMCNC: 28.7 PG — SIGNIFICANT CHANGE UP (ref 27–34)
MCHC RBC-ENTMCNC: 31.6 GM/DL — LOW (ref 32–36)
MCV RBC AUTO: 90.9 FL — SIGNIFICANT CHANGE UP (ref 80–100)
MONOCYTES # BLD AUTO: 0.89 K/UL — SIGNIFICANT CHANGE UP (ref 0–0.9)
MONOCYTES NFR BLD AUTO: 7.3 % — SIGNIFICANT CHANGE UP (ref 2–14)
NEUTROPHILS # BLD AUTO: 8.94 K/UL — HIGH (ref 1.8–7.4)
NEUTROPHILS NFR BLD AUTO: 73.4 % — SIGNIFICANT CHANGE UP (ref 43–77)
PHOSPHATE SERPL-MCNC: 3.5 MG/DL — SIGNIFICANT CHANGE UP (ref 2.4–4.7)
PLATELET # BLD AUTO: 403 K/UL — HIGH (ref 150–400)
POTASSIUM SERPL-MCNC: 4.5 MMOL/L — SIGNIFICANT CHANGE UP (ref 3.5–5.3)
POTASSIUM SERPL-SCNC: 4.5 MMOL/L — SIGNIFICANT CHANGE UP (ref 3.5–5.3)
RBC # BLD: 3.83 M/UL — SIGNIFICANT CHANGE UP (ref 3.8–5.2)
RBC # FLD: 12.7 % — SIGNIFICANT CHANGE UP (ref 10.3–14.5)
SODIUM SERPL-SCNC: 139 MMOL/L — SIGNIFICANT CHANGE UP (ref 135–145)
WBC # BLD: 12.17 K/UL — HIGH (ref 3.8–10.5)
WBC # FLD AUTO: 12.17 K/UL — HIGH (ref 3.8–10.5)

## 2020-02-18 PROCEDURE — 36415 COLL VENOUS BLD VENIPUNCTURE: CPT

## 2020-02-18 PROCEDURE — 85027 COMPLETE CBC AUTOMATED: CPT

## 2020-02-18 PROCEDURE — 84702 CHORIONIC GONADOTROPIN TEST: CPT

## 2020-02-18 PROCEDURE — 86850 RBC ANTIBODY SCREEN: CPT

## 2020-02-18 PROCEDURE — 86900 BLOOD TYPING SEROLOGIC ABO: CPT

## 2020-02-18 PROCEDURE — 88302 TISSUE EXAM BY PATHOLOGIST: CPT

## 2020-02-18 PROCEDURE — 86901 BLOOD TYPING SEROLOGIC RH(D): CPT

## 2020-02-18 PROCEDURE — 74177 CT ABD & PELVIS W/CONTRAST: CPT

## 2020-02-18 PROCEDURE — 80053 COMPREHEN METABOLIC PANEL: CPT

## 2020-02-18 PROCEDURE — T1013: CPT

## 2020-02-18 PROCEDURE — 80048 BASIC METABOLIC PNL TOTAL CA: CPT

## 2020-02-18 PROCEDURE — 83605 ASSAY OF LACTIC ACID: CPT

## 2020-02-18 PROCEDURE — 83735 ASSAY OF MAGNESIUM: CPT

## 2020-02-18 PROCEDURE — 84100 ASSAY OF PHOSPHORUS: CPT

## 2020-02-18 PROCEDURE — 96374 THER/PROPH/DIAG INJ IV PUSH: CPT | Mod: XU

## 2020-02-18 PROCEDURE — 99285 EMERGENCY DEPT VISIT HI MDM: CPT | Mod: 25

## 2020-02-18 PROCEDURE — C1781: CPT

## 2020-02-18 RX ORDER — OXYCODONE HYDROCHLORIDE 5 MG/1
1 TABLET ORAL
Qty: 15 | Refills: 0
Start: 2020-02-18

## 2020-02-18 RX ORDER — TRAMADOL HYDROCHLORIDE 50 MG/1
25 TABLET ORAL EVERY 6 HOURS
Refills: 0 | Status: DISCONTINUED | OUTPATIENT
Start: 2020-02-18 | End: 2020-02-18

## 2020-02-18 RX ORDER — ACETAMINOPHEN 500 MG
2 TABLET ORAL
Qty: 0 | Refills: 0 | DISCHARGE
Start: 2020-02-18

## 2020-02-18 RX ORDER — OXYCODONE HYDROCHLORIDE 5 MG/1
5 TABLET ORAL EVERY 6 HOURS
Refills: 0 | Status: DISCONTINUED | OUTPATIENT
Start: 2020-02-18 | End: 2020-02-18

## 2020-02-18 RX ADMIN — HYDROMORPHONE HYDROCHLORIDE 0.5 MILLIGRAM(S): 2 INJECTION INTRAMUSCULAR; INTRAVENOUS; SUBCUTANEOUS at 05:35

## 2020-02-18 RX ADMIN — OXYCODONE HYDROCHLORIDE 5 MILLIGRAM(S): 5 TABLET ORAL at 03:47

## 2020-02-18 RX ADMIN — Medication 650 MILLIGRAM(S): at 11:33

## 2020-02-18 RX ADMIN — OXYCODONE HYDROCHLORIDE 5 MILLIGRAM(S): 5 TABLET ORAL at 14:58

## 2020-02-18 RX ADMIN — OXYCODONE HYDROCHLORIDE 5 MILLIGRAM(S): 5 TABLET ORAL at 08:41

## 2020-02-18 RX ADMIN — HYDROMORPHONE HYDROCHLORIDE 0.5 MILLIGRAM(S): 2 INJECTION INTRAMUSCULAR; INTRAVENOUS; SUBCUTANEOUS at 05:20

## 2020-02-18 RX ADMIN — OXYCODONE HYDROCHLORIDE 5 MILLIGRAM(S): 5 TABLET ORAL at 09:30

## 2020-02-18 RX ADMIN — Medication 650 MILLIGRAM(S): at 12:15

## 2020-02-18 RX ADMIN — OXYCODONE HYDROCHLORIDE 5 MILLIGRAM(S): 5 TABLET ORAL at 02:47

## 2020-02-18 RX ADMIN — PANTOPRAZOLE SODIUM 40 MILLIGRAM(S): 20 TABLET, DELAYED RELEASE ORAL at 05:20

## 2020-02-18 NOTE — DISCHARGE NOTE PROVIDER - NSDCMRMEDTOKEN_GEN_ALL_CORE_FT
acetaminophen 325 mg oral tablet: 2 tab(s) orally every 6 hours, As needed, Temp greater or equal to 38C (100.4F), Moderate Pain (4 - 6)  oxyCODONE 5 mg oral tablet: 1 tab(s) orally every 4 hours, As needed, Severe Pain (7 - 10) MDD:6 tablets

## 2020-02-18 NOTE — DISCHARGE NOTE NURSING/CASE MANAGEMENT/SOCIAL WORK - PATIENT PORTAL LINK FT
You can access the FollowMyHealth Patient Portal offered by [facility] by registering at the following website: [email or web address]. By joining [employer]’s FollowMyHealth portal, you will also be able to view your health information using other applications (apps) compatible with our system.

## 2020-02-18 NOTE — DISCHARGE NOTE PROVIDER - INSTRUCTIONS
advance diet as tolerated    No strenuous exercise, no contact sports, no heavy lifting,     while on narcotics do not drive, operate heavy machinery, make any important decisions, drink alcohol

## 2020-02-18 NOTE — DISCHARGE NOTE PROVIDER - NSDCCPCAREPLAN_GEN_ALL_CORE_FT
PRINCIPAL DISCHARGE DIAGNOSIS  Diagnosis: Ventral hernia without obstruction or gangrene  Assessment and Plan of Treatment: Please follow up with the acute care surgery outpatient clinic in 7-10 days of discharge. If you should have change in clinical status please return to the emergency room as soon as possible.

## 2020-02-18 NOTE — DISCHARGE NOTE PROVIDER - CARE PROVIDER_API CALL
acute care surgery outpatient clinic,   [address] [place] [address]  Phone: [phone]  Fax: (   )    -  Follow Up Time:

## 2020-02-18 NOTE — PROGRESS NOTE ADULT - ASSESSMENT
Assessment:  The patient is a 34y Female who is now several hours post-op from a VHR with retrorectus mesh    Plan:  - Pain control as needed  - DVT ppx  - OOB and ambulating as tolerated  - regular diet  - likely D/c 2/18 am.

## 2020-02-18 NOTE — DISCHARGE NOTE PROVIDER - HOSPITAL COURSE
34 year old female presenting with chief complaint of abdominal pain x1 day. Patient known to ACS/Trauma team as she had presented with abdominal pain 2/9/20 and found to have a large ventral supraumbilical and periumbilical hernia requiring operative repair. Patient followed up with ACS/Trauma office and was scheduled for surgery 2/19/20 and told to return to ED should abdominal pain worsen prior to surgery. Describes pain as sharp, constant, non radiating pain located in supraumbilical region. Reports that she is tolerating diet, having flatus and had a BM day prior to presentation. ROS negative for fever, chills, nausea, vomiting, chest pain, SOB, dyspnea, dysuria or changes in bowel habits. Patient had a ct scan that showed Stable exam. Supraumbilical ventral abdominal wall fat-containing hernia without evidence of fat necrosis. Small fat-containing umbilical hernia and inflammatory changes in the adjacent ventral abdominal wall fat. No bowel obstruction or wall thickening. Patient was admitted to the acute care surgery service and taken to the operating room for pre-op diagnosis of Incarcerated ventral hernia, post-op diagnosis of  Incarcerated ventral hernia, procedure Ventral hernia repair with mesh retrorectus placement of prolene mesh, operative findings large, chronic, incarcerated ventral hernia with pre-peritoneal fat contents, hernia sac entered and contents amputated ,large prolene mesh placed in retrorectus space. Patient tolerated procedure well, no post-op events, tolerating a diet , pain well controlled on current regimen, voiding with no difficulties, ambulating with steady gait. Patient will be discharged home and follow up with the acute care surgery service in 7-10 days of discharge.

## 2020-02-18 NOTE — PROGRESS NOTE ADULT - ATTENDING COMMENTS
S/P  VHR with retrorectus mesh, Tolerating diet   Abd Dressing C/D/I    1. Diet as tolerated  2. Pain control as needed  3. OOB and ambulating as tolerated    code [id number]

## 2020-02-18 NOTE — DISCHARGE NOTE PROVIDER - PROVIDER TOKENS
FREE:[LAST:[acute care surgery outpatient clinic],PHONE:[phone]],FAX:[(   [phone]    -],ADDRESS:[[address] [place] [address]]]

## 2020-02-18 NOTE — PROGRESS NOTE ADULT - SUBJECTIVE AND OBJECTIVE BOX
HPI/OVERNIGHT EVENTS: Patient is s/p open ventral hernia repair with retrorectus placement of prolene mesh. Patient doing well post operatively. tolerating diet. pain moderately controlled. Patient pulling 1000 on IS. patient voiding spontaneously. ambulating. denies f/c/sob/n/v. AVSS hemodynamically stable.     MEDICATIONS  (STANDING):  enoxaparin Injectable 40 milliGRAM(s) SubCutaneous every 24 hours  pantoprazole    Tablet 40 milliGRAM(s) Oral before breakfast  senna 2 Tablet(s) Oral at bedtime    MEDICATIONS  (PRN):  acetaminophen   Tablet .. 650 milliGRAM(s) Oral every 6 hours PRN Temp greater or equal to 38C (100.4F), Moderate Pain (4 - 6)  HYDROmorphone  Injectable 0.5 milliGRAM(s) IV Push every 4 hours PRN Severe Pain (7 - 10)  ondansetron Injectable 4 milliGRAM(s) IV Push every 6 hours PRN Nausea  oxyCODONE    IR 5 milliGRAM(s) Oral every 4 hours PRN Severe Pain (7 - 10)      Vital Signs Last 24 Hrs  T(C): 37.1 (18 Feb 2020 04:34), Max: 37.2 (17 Feb 2020 14:33)  T(F): 98.8 (18 Feb 2020 04:34), Max: 99 (17 Feb 2020 14:33)  HR: 72 (18 Feb 2020 04:34) (67 - 97)  BP: 105/68 (18 Feb 2020 04:34) (95/54 - 133/71)  BP(mean): 62 (17 Feb 2020 16:30) (62 - 74)  RR: 18 (18 Feb 2020 04:34) (16 - 22)  SpO2: 95% (18 Feb 2020 04:34) (93% - 100%)      Physical Exam:  General: NAD, resting comfortably in bed  Pulmonary: Nonlabored breathing, no respiratory distress  Cardiovascular: NSR  Abdominal: soft, appropriately tender. abdominal binder in place. dressings with mild serosanguinous saturation without active bleeding  Extremities: [place]      I&O's Detail    17 Feb 2020 07:01  -  18 Feb 2020 05:47  --------------------------------------------------------  IN:    Oral Fluid: 300 mL    sodium chloride 0.9%: 1320 mL  Total IN: 1620 mL    OUT:    Voided: 2250 mL  Total OUT: 2250 mL    Total NET: -630 mL          LABS:                        11.5   6.89  )-----------( 387      ( 17 Feb 2020 04:16 )             35.1     02-17    139  |  104  |  16.0  ----------------------------<  87  3.8   |  27.0  |  0.56    Ca    8.8      17 Feb 2020 04:16    TPro  6.8  /  Alb  4.2  /  TBili  0.3<L>  /  DBili  x   /  AST  21  /  ALT  22  /  AlkPhos  88  02-17

## 2020-02-18 NOTE — DISCHARGE NOTE PROVIDER - NSDCACTIVITY_GEN_ALL_CORE
Walking - Indoors allowed/No heavy lifting/straining/Do not make important decisions/Stairs allowed/Sex allowed/Do not drive or operate machinery/Showering allowed/Walking - Outdoors allowed

## 2020-02-24 LAB — SURGICAL PATHOLOGY STUDY: SIGNIFICANT CHANGE UP

## 2020-02-25 ENCOUNTER — APPOINTMENT (OUTPATIENT)
Dept: TRAUMA SURGERY | Facility: CLINIC | Age: 35
End: 2020-02-25

## 2020-02-25 VITALS
DIASTOLIC BLOOD PRESSURE: 75 MMHG | OXYGEN SATURATION: 100 % | HEART RATE: 88 BPM | BODY MASS INDEX: 38.93 KG/M2 | SYSTOLIC BLOOD PRESSURE: 116 MMHG | WEIGHT: 217 LBS | HEIGHT: 62.5 IN | TEMPERATURE: 98.6 F

## 2020-03-24 ENCOUNTER — APPOINTMENT (OUTPATIENT)
Dept: TRAUMA SURGERY | Facility: CLINIC | Age: 35
End: 2020-03-24
Payer: COMMERCIAL

## 2020-03-24 VITALS
HEART RATE: 85 BPM | WEIGHT: 220 LBS | DIASTOLIC BLOOD PRESSURE: 74 MMHG | SYSTOLIC BLOOD PRESSURE: 111 MMHG | HEIGHT: 62.5 IN | BODY MASS INDEX: 39.47 KG/M2 | OXYGEN SATURATION: 96 % | TEMPERATURE: 98.3 F

## 2020-03-24 DIAGNOSIS — K43.9 VENTRAL HERNIA W/OUT OBSTRUCTION OR GANGRENE: ICD-10-CM

## 2020-03-24 PROCEDURE — 99024 POSTOP FOLLOW-UP VISIT: CPT

## 2020-03-25 PROBLEM — K43.9 VENTRAL HERNIA WITHOUT OBSTRUCTION OR GANGRENE: Status: ACTIVE | Noted: 2020-02-11

## 2020-03-25 NOTE — HISTORY OF PRESENT ILLNESS
[FreeTextEntry1] : Patient presents  to [facility]  for  post op  exam  s/p ventral hernia  repair  02/17/2020   Patient at time  of  this  exam  denies  any  fever no  chills  no n/v/d  nl appetite  nl bm nl urination  nl activity level no fatigue

## 2020-03-25 NOTE — ASSESSMENT
[FreeTextEntry1] : RTC prn \par any a cute  symptoms and or  concerns  call back [facility] or  go  directly to [facility]\par Diet modification -advised  patient  possible weight loss would be  beneficial  to  abdominal area

## 2020-03-25 NOTE — PHYSICAL EXAM
[Abdominal Masses] : No abdominal masses [Abdomen Tenderness] : ~T ~M No abdominal tenderness [No Rash or Lesion] : No rash or lesion [Alert] : alert [Oriented to Person] : oriented to person [Oriented to Place] : oriented to place [Oriented to Time] : oriented to time [Calm] : calm [de-identified] : wdwn female  in  nad [de-identified] : PERRLA EOMS intact  non icteric s kelli [de-identified] : trachea  midline [de-identified] : soft  non tender  no distension  of  abdomen   incision site  c/d/i no seroma  formation no  signs  of  cellulitis  no  guarding  no rebound  no  palpable  masses

## 2020-04-23 NOTE — BRIEF OPERATIVE NOTE - NSICDXBRIEFPREOP_GEN_ALL_CORE_FT
Patient needs scheduled for an injection. Pre-Procedure sheet was given to the patient. x1 (RIGHT L4 & RIGHT L5 TF) #1  x1 (B/L L5 TF) #2    HAYDEN Categorized as level 2    Please call patient to schedule once able to do so. PRE-OP DIAGNOSIS:  Incarcerated ventral hernia 17-Feb-2020 14:39:17  Jarek Longoria

## 2021-02-02 NOTE — DISCHARGE NOTE PROVIDER - NSDCCPGOAL_GEN_ALL_CORE_FT
Psychiatry Progress Note    Chief Complaint:  SI with plan to OD    Subjective:  Pt reports feeling tired today, more than yesterday, despite sleeping well. She was noted to be more social yesterday, which she attributes to getting more comfortable with this group, rather than to medication benefit. She reports some increase in anxiety with the med increases, including increase in racing and intrusive thoughts, though says it is tolerable. She requested a PRN for anxiety yesterday. She reports +SI in \"the background\" and says it increases when she is alone. She has been going to groups.     Objective:  Allergies:   ALLERGIES:  No Known Allergies     Current Medications:  Current Facility-Administered Medications   Medication Dose Route Frequency Provider Last Rate Last Admin   • hydrOXYzine (ATARAX) tablet 50 mg  50 mg Oral Q6H PRN Stella Jiménez MD       • busPIRone (BUSPAR) tablet 15 mg  15 mg Oral BID Stella Jiménez MD   15 mg at 02/02/21 0849   • LORazepam (ATIVAN) injection 2 mg  2 mg Intramuscular Q4H PRN Stella Jiménez MD        Or   • LORazepam (ATIVAN) tablet 2 mg  2 mg Oral Q4H PRN Stella Jiménez MD   2 mg at 02/01/21 1455   • haloperidol (HALDOL) 5 MG/ML injection 5 mg  5 mg Intramuscular Q4H PRN Stella Jiménez MD        Or   • haloperidol (HALDOL) tablet 2 mg  2 mg Oral Q4H PRN Stella Jiménez MD       • acetaminophen (TYLENOL) tablet 650 mg  650 mg Oral Q4H PRN Stella Jiménez MD       • aluminum-magnesium hydroxide-simethicone (MAALOX) 200-200-20 MG/5ML suspension 20 mL  20 mL Oral BID PRN Stella Jiménez MD       • melatonin tablet 3 mg  3 mg Oral QHS PRN Stella Jiménez MD   3 mg at 02/01/21 2101   • hydrOXYzine (ATARAX) tablet 50 mg  50 mg Oral QHS Stella Jiménez MD   50 mg at 02/01/21 2101   • lamoTRIgine (LaMICtal) tablet 100 mg  100 mg Oral Daily Stella Jiménez MD   100 mg at 02/02/21 0849   • venlafaxine XR (EFFEXOR XR) 24 hr capsule  225 mg  225 mg Oral Daily with breakfast Stella Jiménez MD   225 mg at 02/02/21 0849   • buPROPion XL (WELLBUTRIN XL) tablet 300 mg  300 mg Oral Daily Stella Jiménez MD   300 mg at 02/02/21 0849       Labs:  No results found for this or any previous visit (from the past 24 hour(s)).    Vitals:  Vitals with min/max:      Vital Last Value 24 Hour Range   Temperature 98.1 °F (36.7 °C) (02/02/21 0610) Temp  Min: 98.1 °F (36.7 °C)  Max: 98.6 °F (37 °C)   Pulse 82 (02/02/21 0610) Pulse  Min: 82  Max: 84   Respiratory 18 (02/01/21 1500) Resp  Min: 18  Max: 18   Non-Invasive  Blood Pressure 103/70 (02/02/21 0610) BP  Min: 103/70  Max: 104/71   Pulse Oximetry 97 % (02/02/21 0610) SpO2  Min: 97 %  Max: 97 %   Arterial   Blood Pressure   No data recorded          Mental Status Exam:     APPEARANCE: 26 year old female appears stated age, fair grooming/hygiene, no acute distress     BEHAVIOR: Pleasant, calm, engaged, good eye contact     SPEECH: RRR, normal tone and volume     MOTOR: NO PMR/PMA, tics, or tremors noted     MOOD: \"tired\"  AFFECT: dysphoric, constricted     THOUGHT PROCESS: linear and logical     THOUGHT CONTENT: reports \"background noise\" SI with no plans     PERCEPTIONS: Denies AVH, does not appear to respond to internal stimuli     INSIGHT: fair     JUDGMENT: fair     COGNITION: A and Ox3, fair concentration, with intact short and long term memory         Assessment: Vandana Thorne 25 year old female with history of has Major depressive disorder, recurrent (provisional diagnosis); Alcohol use disorder in remission; and Uses marijuana on their problem list. presents with increasing SI, following recent admission with discharge 1/18. Pt arrived at ED while engaging in safety plan, which was to come if she had a plan. 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  Of note, she has a +family history of bipolar disorder, with no history of manic episodes herself, but has not found benefit from 2 past antidepressant trials (an SSRI and then duloxetine). She has now been on effexor for 16 days, since last admission, reports medication compliance.         Diagnosis:   Active Problems:    Episode of recurrent major depressive disorder (CMS/[facility])    Alcohol use disorder in remission    Uses marijuana    ROSALIND (generalized anxiety disorder)        Plan:   · Admit to [address] for Risk of harm to self  · Increase wellbutrin XL to 300mg daily and effexor XR to 225mg daily. Increase lamotrigine to 100mg daily with plans for continued titration.  · Continue buspirone 15mg BID, hydroxyzine 50mg qHS, melatonin 3mg PRN sleep.   · Discussed risk, benefits, side effects, and alternatives to treatment. Patient voiced understanding and gave consent.  · Patient will be stabilized via medication management and mileu therapy  · Obtain collateral from [facility]  · Discharge Planning: intake for residential care at [facility] scheduled for 2/4.   · Continue to observe for signs of bipolar disorder     After examining the patient and reviewing the data, my clinical impression is that the patient does not present with serious suicidal ideation requiring 1:1 continuous monitoring at arms length in the current setting.      I certify the following:   Inpatient Psychiatric hospital services furnished since the previous certifications or recertification were and continue to be medically neccessary for either treatment which could reasonably be expected to improve the patients condition or diagnostic study and the hospital records indicate that the services furnished were either intensive treatment services, admission, and related services neccessary for diagnostic study or equivalent services     The patient continues to need, on a daily basis, active treatment furnished directly by or requiring or the  supervision of inpatient psychiatric facility personnel.      I anticipate the patient will remain in the hospital for 7-10 days.          To get better and follow your care plan as instructed.

## 2021-07-20 ENCOUNTER — APPOINTMENT (OUTPATIENT)
Dept: ENDOCRINOLOGY | Facility: CLINIC | Age: 36
End: 2021-07-20
Payer: COMMERCIAL

## 2021-07-20 VITALS
HEART RATE: 72 BPM | OXYGEN SATURATION: 99 % | DIASTOLIC BLOOD PRESSURE: 62 MMHG | HEIGHT: 62 IN | WEIGHT: 225 LBS | SYSTOLIC BLOOD PRESSURE: 118 MMHG | BODY MASS INDEX: 41.41 KG/M2

## 2021-07-20 DIAGNOSIS — Z78.9 OTHER SPECIFIED HEALTH STATUS: ICD-10-CM

## 2021-07-20 DIAGNOSIS — E66.9 OBESITY, UNSPECIFIED: ICD-10-CM

## 2021-07-20 DIAGNOSIS — Z83.3 FAMILY HISTORY OF DIABETES MELLITUS: ICD-10-CM

## 2021-07-20 DIAGNOSIS — Z13.29 ENCOUNTER FOR SCREENING FOR OTHER SUSPECTED ENDOCRINE DISORDER: ICD-10-CM

## 2021-07-20 PROCEDURE — 99204 OFFICE O/P NEW MOD 45 MIN: CPT

## 2021-07-20 RX ORDER — MULTIVITAMIN
TABLET ORAL
Refills: 0 | Status: ACTIVE | COMMUNITY

## 2021-07-20 RX ORDER — BACILLUS COAGULANS/INULIN 1B-250 MG
CAPSULE ORAL
Refills: 0 | Status: ACTIVE | COMMUNITY

## 2021-07-20 NOTE — ASSESSMENT
[Importance of Diet and Exercise] : importance of diet and exercise to improve glycemic control, achieve weight loss and improve cardiovascular health [Weight Loss] : weight loss [FreeTextEntry1] : Screening for Hypothyroidism: check TFTs \par - referral given for thyroid sonogram to r/o thyroid nodules\par \par Screening for DM due to family history and Acanthosis Nigricans: check A1c and fasting blood sugar \par \par Obesity: discussed weight loss, encouraged to increase routine exercise to 150 minutes a week \par - modify portion size \par \par Irregular menses: check hormone levels and prolactin level\par \par RTO in 3 weeks to discuss test results \par \par

## 2021-07-20 NOTE — HISTORY OF PRESENT ILLNESS
[FreeTextEntry1] : Quality: Screening for Hypothyroidism\par Severity: mild \par Associated Symptoms: + fatigue, weight gain. no neck pain, dysphonia, or dysphagia \par \par Notes:\par Gynecology: Dr. Eva Zayas [facility]\par  1 st child weighed 9 lbs  (boy)\par 2nd child weighed almost 10 pounds  (girl)  \par hx of Tubal ligation \par Irregular menses - no period in 3 years \par \par \par Current Regimen: taking appropriately\par \par Weight gain: 20 pounds \par \par \par Labs reviewed: None \par \par Date of last thyroid sonogram: None\par \par PMH: \par Obesity \par \par PSH:Tubal ligation 2018 - after second child due to  having more children\par hernia repair

## 2021-07-20 NOTE — REVIEW OF SYSTEMS
[Fatigue] : fatigue [Recent Weight Gain (___ Lbs)] : recent weight gain: [unfilled] lbs [Constipation] : constipation [Joint Pain] : joint pain [Dry Skin] : dry skin [Hair Loss] : hair loss [Anxiety] : anxiety [Decreased Appetite] : appetite not decreased [Visual Field Defect] : no visual field defect [Blurred Vision] : no blurred vision [Dysphagia] : no dysphagia [Neck Pain] : no neck pain [Dysphonia] : no dysphonia [Chest Pain] : no chest pain [Palpitations] : no palpitations [Polyuria] : no polyuria [Diarrhea] : no diarrhea [Dysuria] : no dysuria [Headaches] : no headaches [Tremors] : no tremors [Depression] : no depression [Cold Intolerance] : no cold intolerance [Polydipsia] : no polydipsia [Heat Intolerance] : no heat intolerance [de-identified] : dry mouth

## 2021-07-20 NOTE — REASON FOR VISIT
[Initial Evaluation] : an initial evaluation [Other___] : [unfilled] [[employer] ] : provided by [employer]   [FreeTextEntry1] : [id number] [FreeTextEntry2] : Dr. Eva Zayas  [TWNoteComboBox1] : Martiniqu[place]

## 2021-07-20 NOTE — PHYSICAL EXAM
[Alert] : alert [Well Nourished] : well nourished [No Acute Distress] : no acute distress [Well Developed] : well developed [Normal Sclera/Conjunctiva] : normal sclera/conjunctiva [EOMI] : extra ocular movement intact [No LAD] : no lymphadenopathy [Thyroid Not Enlarged] : the thyroid was not enlarged [No Thyroid Nodules] : no palpable thyroid nodules [No Respiratory Distress] : no respiratory distress [No Accessory Muscle Use] : no accessory muscle use [Normal Rate and Effort] : normal respiratory rate and effort [Clear to Auscultation] : lungs were clear to auscultation bilaterally [Normal S1, S2] : normal S1 and S2 [Normal Rate] : heart rate was normal [Regular Rhythm] : with a regular rhythm [No Edema] : no peripheral edema [Pedal Pulses Normal] : the pedal pulses are present [Normal Bowel Sounds] : normal bowel sounds [Not Tender] : non-tender [Soft] : abdomen soft [Normal Gait] : normal gait [No Rash] : no rash [Acanthosis Nigricans] : acanthosis nigricans present [Right Foot Was Examined] : right foot ~C was examined [Left Foot Was Examined] : left foot ~C was examined [Normal] : normal [Full ROM] : with full range of motion [No Tremors] : no tremors [Oriented x3] : oriented to person, place, and time [Normal Affect] : the affect was normal [Normal Insight/Judgement] : insight and judgment were intact [Normal Mood] : the mood was normal [Foot Ulcers] : no foot ulcers [Diminished Throughout Both Feet] : normal tactile sensation with monofilament testing throughout both feet

## 2021-07-21 ENCOUNTER — LABORATORY RESULT (OUTPATIENT)
Age: 36
End: 2021-07-21

## 2021-07-21 ENCOUNTER — APPOINTMENT (OUTPATIENT)
Dept: ULTRASOUND IMAGING | Facility: CLINIC | Age: 36
End: 2021-07-21
Payer: COMMERCIAL

## 2021-07-21 ENCOUNTER — OUTPATIENT (OUTPATIENT)
Dept: OUTPATIENT SERVICES | Facility: HOSPITAL | Age: 36
LOS: 1 days | End: 2021-07-21

## 2021-07-21 DIAGNOSIS — Z13.29 ENCOUNTER FOR SCREENING FOR OTHER SUSPECTED ENDOCRINE DISORDER: ICD-10-CM

## 2021-07-21 PROCEDURE — 76536 US EXAM OF HEAD AND NECK: CPT | Mod: 26

## 2021-08-09 ENCOUNTER — APPOINTMENT (OUTPATIENT)
Dept: ENDOCRINOLOGY | Facility: CLINIC | Age: 36
End: 2021-08-09

## 2021-08-12 ENCOUNTER — APPOINTMENT (OUTPATIENT)
Dept: RHEUMATOLOGY | Facility: CLINIC | Age: 36
End: 2021-08-12

## 2021-09-02 ENCOUNTER — APPOINTMENT (OUTPATIENT)
Dept: RHEUMATOLOGY | Facility: CLINIC | Age: 36
End: 2021-09-02

## 2021-11-03 ENCOUNTER — APPOINTMENT (OUTPATIENT)
Dept: ENDOCRINOLOGY | Facility: CLINIC | Age: 36
End: 2021-11-03
Payer: COMMERCIAL

## 2021-11-03 VITALS
SYSTOLIC BLOOD PRESSURE: 106 MMHG | HEIGHT: 62 IN | DIASTOLIC BLOOD PRESSURE: 64 MMHG | OXYGEN SATURATION: 98 % | HEART RATE: 74 BPM | BODY MASS INDEX: 44.72 KG/M2 | WEIGHT: 243 LBS

## 2021-11-03 DIAGNOSIS — N92.6 IRREGULAR MENSTRUATION, UNSPECIFIED: ICD-10-CM

## 2021-11-03 DIAGNOSIS — Z13.1 ENCOUNTER FOR SCREENING FOR DIABETES MELLITUS: ICD-10-CM

## 2021-11-03 PROCEDURE — 99213 OFFICE O/P EST LOW 20 MIN: CPT

## 2021-11-03 NOTE — HISTORY OF PRESENT ILLNESS
[FreeTextEntry1] : Pt. going to having gastric bypass surgery in the future in January or February, with Dr. Gallegos \par \par Quality: Screening for Hypothyroidism\par Severity: mild \par Associated Symptoms: + fatigue, weight gain. no neck pain, dysphonia, or dysphagia \par \par Notes:\par Gynecology: Dr. [facility]ay[facility]\par  1 st child weighed 9 lbs  (boy)\par 2nd child weighed almost 10 pounds  (girl)  \par hx of Tubal ligation \par Irregular menses - no period in 3 years \par \par \par Current Regimen: taking appropriately\par \par Weight gain: 20 pounds \par \par \par Date of last thyroid sonogram: 21 Heterogeneous thyroid gland without discrete nodules\par \par PMH: \par Obesity \par \par PSH:Tubal ligation 2018 - after second child due to  having more children\par hernia repair

## 2021-11-03 NOTE — REASON FOR VISIT
[Follow - Up] : a follow-up visit [Other___] : [unfilled] [[employer] ] : provided by [employer]   [Interpreters_IDNumber] : [id number] [Interpreters_FullName] : Eula [TWNoteComboBox1] : [place]

## 2021-11-03 NOTE — PHYSICAL EXAM
[Alert] : alert [Well Nourished] : well nourished [No Acute Distress] : no acute distress [Well Developed] : well developed [Normal Sclera/Conjunctiva] : normal sclera/conjunctiva [EOMI] : extra ocular movement intact [No LAD] : no lymphadenopathy [Thyroid Not Enlarged] : the thyroid was not enlarged [No Thyroid Nodules] : no palpable thyroid nodules [No Respiratory Distress] : no respiratory distress [No Accessory Muscle Use] : no accessory muscle use [Normal Rate and Effort] : normal respiratory rate and effort [Clear to Auscultation] : lungs were clear to auscultation bilaterally [Normal S1, S2] : normal S1 and S2 [Normal Rate] : heart rate was normal [Regular Rhythm] : with a regular rhythm [No Edema] : no peripheral edema [Pedal Pulses Normal] : the pedal pulses are present [Normal Bowel Sounds] : normal bowel sounds [Not Tender] : non-tender [Soft] : abdomen soft [Normal Gait] : normal gait [No Rash] : no rash [Acanthosis Nigricans] : acanthosis nigricans present [Foot Ulcers] : no foot ulcers [Normal] : normal [Full ROM] : with full range of motion [Diminished Throughout Both Feet] : normal tactile sensation with monofilament testing throughout both feet [No Tremors] : no tremors [Oriented x3] : oriented to person, place, and time [Normal Affect] : the affect was normal [Normal Insight/Judgement] : insight and judgment were intact [Normal Mood] : the mood was normal

## 2021-11-03 NOTE — ASSESSMENT
[FreeTextEntry1] : All results reviewed with patient. Insulin level is elevated however A1C is normal. She would benefit from a meeting with dietitian for diet changes. Recommend to increase routine exercise at least 30 minutes a day. She would also benefit from the gastric bypass surgery. All lab results given to patient. \par \par Irregular menses: follow up with gyn \par \par RTO as needed\par \par  [Importance of Diet and Exercise] : importance of diet and exercise to improve glycemic control, achieve weight loss and improve cardiovascular health

## 2021-11-03 NOTE — REVIEW OF SYSTEMS
[Decreased Appetite] : decreased appetite [Constipation] : constipation [Fatigue] : no fatigue [Recent Weight Gain (___ Lbs)] : no recent weight gain [Recent Weight Loss (___ Lbs)] : no recent weight loss [Chest Pain] : no chest pain [Palpitations] : no palpitations [Diarrhea] : no diarrhea [Polyuria] : no polyuria [Dysuria] : no dysuria [Polydipsia] : no polydipsia [FreeTextEntry2] : weight stable

## 2022-06-23 NOTE — PATIENT PROFILE ADULT - IS PATIENT PREGNANT?
Letter sent to patient with colonoscopy results. Health maintenance updated. Recall placed. Surgical history updated     no

## 2023-06-02 ENCOUNTER — APPOINTMENT (OUTPATIENT)
Dept: ENDOCRINOLOGY | Facility: CLINIC | Age: 38
End: 2023-06-02

## 2023-10-21 NOTE — PATIENT PROFILE ADULT - NSPROMUTANXFEARFT_GEN_A_NUR
Diagnosed 10/2022  Repeat CT 3/2023 with resolution    -heparin stopped as patient's platelet level fell to 35,000. Has since recovered to 85,000 so home eliquis restarted   none

## 2023-11-06 NOTE — ED PROVIDER NOTE - PHYSICAL EXAMINATION
Medicare Wellness Visit  Plan for Preventive Care    A good way for you to stay healthy is to use preventive care.  Medicare covers many services that can help you stay healthy.* The goal of these services is to find any health problems as quickly as possible. Finding problems early can help make them easier to treat.  Your personal plan below lists the services you may need and when they are due.      Health Maintenance Summary     DTaP/Tdap/Td Vaccine (1 - Tdap)  Overdue since 10/23/2010    Medicare Advantage- Medicare Wellness Visit (Yearly - January to December)  Overdue since 1/1/2023    Influenza Vaccine (1)  Overdue since 9/1/2023    COVID-19 Vaccine (4 - 2023-24 season)  Overdue since 9/1/2023    Depression Screening (Yearly)  Next due on 10/24/2024    Breast Cancer Screening (Every 2 Years)  Next due on 3/13/2025    Colorectal Cancer Risk - Colonoscopy (Every 5 Years)  Next due on 11/28/2027    Hepatitis C Screening   Completed    Shingles Vaccine   Completed    Pneumococcal Vaccine 65+   Completed    Osteoporosis Screening   Ordered on 9/26/2023    Meningococcal Vaccine   Aged Out    Hepatitis B Vaccine (For Physician/[facility] Discussion)   Aged Out    HPV Vaccine   Aged Out    Colorectal Cancer Screen-   Discontinued           Preventive Care for Women and Men    Heart Screenings (Cardiovascular):  · Blood tests are used to check your cholesterol, lipid and triglyceride levels. High levels can increase your risk for heart disease and stroke. High levels can be treated with medications, diet and exercise. Lowering your levels can help keep your heart and blood vessels healthy.  Your provider will order these tests if they are needed.    · An ultrasound is done to see if you have an abdominal aortic aneurysm (AAA).  This is an enlargement of one of the main blood vessels that delivers blood to the body.   In the [place], 9,000 deaths are caused by AAA.  You may not even know you have this problem and as  many as 1 in 3 people will have a serious problem if it is not treated.  Early diagnosis allows for more effective treatment and cure.  If you have a family history of AAA or are a male age 65-75 who has smoked, you are at higher risk of an AAA.  Your provider can order this test, if needed.    Colorectal Screening:  · There are many tests that are used to check for cancer of your colon and rectum. You and your provider should discuss what test is best for you and when to have it done.  Options include:  · Screening Colonoscopy: exam of the entire colon, seen through a flexible lighted tube.  · Flexible Sigmoidoscopy: exam of the last third (sigmoid portion) of the colon and rectum, seen through a flexible lighted tube.  · Cologuard DNA stool test: a sample of your stool is used to screen for cancer and unseen blood in your stool.  · Fecal Occult Blood Test: a sample of your stool is studied to find any unseen blood    Flu Shot:  · An immunization that helps to prevent influenza (the flu). You should get this every year. The best time to get the shot is in the fall.    Pneumococcal Shot:  • Vaccines help prevent pneumococcal disease, which is any type of illness caused by Streptococcus pneumoniae bacteria. There are two kinds of pneumococcal vaccines available in the [place]:   o Pneumococcal conjugate vaccines (PCV20 or [id number]®)  o Pneumococcal polysaccharide vaccine (PPSV23 or [id number]®)  · For those who have never received any pneumococcal conjugate vaccine, CDC recommends PVC20 for adults 65 years or older and adults 19 through 64 years old with certain medical conditions or risk factors.   · For those who have previously received PCV13, this should be followed by a dose of PPSV23.     Hepatitis B Shot:  · An immunization that helps to protect people from getting Hepatitis B. Hepatitis B is a virus that spreads through contact with infected blood or body fluids. Many people with the virus do not have  symptoms.  The virus can lead to serious problems, such as liver disease. Some people are at higher risk than others. Your doctor will tell you if you need this shot.     Diabetes Screening:  · A test to measure sugar (glucose) in your blood is called a fasting blood sugar. Fasting means you cannot have food or drink for at least 8 hours before the test. This test can detect diabetes long before you may notice symptoms.    Glaucoma Screening:  · Glaucoma screening is performed by your eye doctor. The test measures the fluid pressure inside your eyes to determine if you have glaucoma.     Hepatitis C Screening:  · A blood test to see if you have the hepatitis C virus.  Hepatitis C attacks the liver and is a major cause of chronic liver disease.  Medicare will cover a single screening for all adults born between 1945 & 1965, or high risk patients (people who have injected illegal drugs or people who have had blood transfusions).  High risk patients who continue to inject illegal drugs can be screened for Hepatitis C every year.    Smoking and Tobacco-Use Cessation Counseling:  · Tobacco is the single greatest cause of disease and early death in our country today. Medication and counseling together can increase a person’s chance of quitting for good.   · Medicare covers two quitting attempts per year, with four counseling sessions per attempt (eight sessions in a 12 month period)    Preventive Screening tests for Women    Screening Mammograms and Breast Exams:  · An x-ray of your breasts to check for breast cancer before you or your doctor may be able to feel it.  If breast cancer is found early it can usually be treated with success.    Pelvic Exams and Pap Tests:  · An exam to check for cervical and vaginal cancer. A Pap test is a lab test in which cells are taken from your cervix and sent to the lab to look for signs of cervical cancer. If cancer of the cervix is found early, chances for a cure are good. Testing can  generally end at age 65, or if a woman has a hysterectomy for a benign condition. Your provider may recommend more frequent testing if certain abnormal results are found.    Bone Mass Measurements:  · A painless x-ray of your bone density to see if you are at risk for a broken bone. Bone density refers to the thickness of bones or how tightly the bone tissue is packed.    Preventive Screening tests for Men    Prostate Screening:  · Should you have a prostate cancer test (PSA)?  It is up to you to decide if you want a prostate cancer test. Talk to your clinician to find out if the test is right for you.  Things for you to consider and talk about should include:  · Benefits and harms of the test  · Your family history  · How your race/ethnicity may influence the test  · If the test may impact other medical conditions you have  · Your values on screenings and treatments    *Medicare pays for many preventive services to keep you healthy. For some of these services, you might have to pay a deductible, coinsurance, and / or copayment.  The amounts vary depending on the type of services you need and the kind of Medicare health plan you have.    For further details on screenings offered by Medicare please visit: https://www.medicare.gov/coverage/preventive-screening-services    Const: Awake, alert and oriented. In no acute distress. Well appearing.  HEENT: NC/AT. Moist mucous membranes.  Eyes: No scleral icterus. EOMI.  Neck:. Soft and supple. Full ROM without pain.  Cardiac:  +S1/S2. No murmurs. Peripheral pulses 2+ and symmetric. No LE edema.  Resp: Speaking in full sentences. No evidence of respiratory distress. No wheezes, rales or rhonchi.  Abd: Soft, periumbilical hernia mild tenderness on palpation reducible, non-distended. Normal bowel sounds in all 4 quadrants. No guarding or rebound.  Back: Spine midline and non-tender. No CVAT.  Skin: No rashes, abrasions or lacerations.  Lymph: No cervical lymphadenopathy.  Neuro: Awake, alert & oriented x 3. Moves all extremities symmetrically.

## 2024-03-29 NOTE — ED ADULT TRIAGE NOTE - BP NONINVASIVE SYSTOLIC (MM HG)
Price (Do Not Change): 0.00 Instructions: This plan will send the code FBSE to the PM system.  DO NOT or CHANGE the price. Detail Level: Simple 115